# Patient Record
Sex: MALE | Race: WHITE | NOT HISPANIC OR LATINO | Employment: UNEMPLOYED | URBAN - METROPOLITAN AREA
[De-identification: names, ages, dates, MRNs, and addresses within clinical notes are randomized per-mention and may not be internally consistent; named-entity substitution may affect disease eponyms.]

---
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
## 2025-01-07 ENCOUNTER — OFFICE VISIT (OUTPATIENT)
Dept: FAMILY MEDICINE | Facility: CLINIC | Age: 20
End: 2025-01-07
Payer: COMMERCIAL

## 2025-01-07 VITALS
SYSTOLIC BLOOD PRESSURE: 108 MMHG | RESPIRATION RATE: 18 BRPM | WEIGHT: 161.3 LBS | HEART RATE: 95 BPM | OXYGEN SATURATION: 99 % | DIASTOLIC BLOOD PRESSURE: 78 MMHG | TEMPERATURE: 99.1 F | BODY MASS INDEX: 25.31 KG/M2 | HEIGHT: 67 IN

## 2025-01-07 DIAGNOSIS — J02.9 SORE THROAT: Primary | ICD-10-CM

## 2025-01-07 LAB
DEPRECATED S PYO AG THROAT QL EIA: NEGATIVE
MONOCYTES NFR BLD AUTO: NEGATIVE %
S PYO DNA THROAT QL NAA+PROBE: NOT DETECTED

## 2025-01-07 PROCEDURE — 36415 COLL VENOUS BLD VENIPUNCTURE: CPT | Performed by: NURSE PRACTITIONER

## 2025-01-07 PROCEDURE — 99213 OFFICE O/P EST LOW 20 MIN: CPT | Performed by: NURSE PRACTITIONER

## 2025-01-07 PROCEDURE — 86308 HETEROPHILE ANTIBODY SCREEN: CPT | Performed by: NURSE PRACTITIONER

## 2025-01-07 PROCEDURE — 87651 STREP A DNA AMP PROBE: CPT | Performed by: NURSE PRACTITIONER

## 2025-01-07 ASSESSMENT — ENCOUNTER SYMPTOMS: SORE THROAT: 1

## 2025-01-07 NOTE — PROGRESS NOTES
"Assessment & Plan     Sore throat  Likely viral illness with negative strep and mono.  Will send in penicillin if PCR strep is positive.  Fluids, rest, tylenol and ibuprofen as needed.    - Streptococcus A Rapid Screen w/Reflex to PCR - Clinic Collect  - Group A Streptococcus PCR Throat Swab  - Mononucleosis screen; Future  - Mononucleosis screen          BMI  Estimated body mass index is 25.08 kg/m  as calculated from the following:    Height as of this encounter: 1.708 m (5' 7.24\").    Weight as of this encounter: 73.2 kg (161 lb 4.8 oz).             Maile Muir is a 19 year old, presenting for the following health issues:  Pharyngitis        1/7/2025    12:54 PM   Additional Questions   Roomed by ABBI Cutler     History of Present Illness       Reason for visit:  Sore Throat  Symptom onset:  3-7 days ago  Symptoms include:  Fever, body chills, waking up in a full sweat,sore throat,diarrhea, runny nose  Symptom intensity:  Moderate  Symptom progression:  Staying the same  Had these symptoms before:  No  What makes it worse:  At night  What makes it better:  Ibuprofen   He is taking medications regularly.                     Objective    /78 (BP Location: Right arm, Patient Position: Sitting, Cuff Size: Adult Regular)   Pulse 95   Temp 99.1  F (37.3  C) (Oral)   Resp 18   Ht 1.708 m (5' 7.24\")   Wt 73.2 kg (161 lb 4.8 oz)   SpO2 99%   BMI 25.08 kg/m    Body mass index is 25.08 kg/m .  Physical Exam  Vitals reviewed.   HENT:      Right Ear: Hearing, tympanic membrane, ear canal and external ear normal.      Left Ear: Hearing, tympanic membrane, ear canal and external ear normal.      Nose: Nose normal.      Mouth/Throat:      Mouth: Mucous membranes are moist.      Pharynx: Oropharynx is clear. Posterior oropharyngeal erythema present.      Tonsils: Tonsillar exudate present. 3+ on the right. 3+ on the left.   Cardiovascular:      Rate and Rhythm: Normal rate and regular rhythm.   Pulmonary:      " Effort: Pulmonary effort is normal.      Breath sounds: Normal breath sounds.   Lymphadenopathy:      Cervical: Cervical adenopathy present.      Right cervical: Superficial cervical adenopathy and posterior cervical adenopathy present.      Left cervical: Superficial cervical adenopathy and posterior cervical adenopathy present.   Neurological:      General: No focal deficit present.      Mental Status: He is alert and oriented to person, place, and time.            Results for orders placed or performed in visit on 01/07/25   Mononucleosis screen     Status: Normal   Result Value Ref Range    Mononucleosis Screen Negative Negative   Streptococcus A Rapid Screen w/Reflex to PCR - Clinic Collect     Status: Normal    Specimen: Throat; Swab   Result Value Ref Range    Group A Strep antigen Negative Negative           Signed Electronically by: ARBEN SIMENTAL CNP